# Patient Record
Sex: MALE | Race: WHITE | NOT HISPANIC OR LATINO | URBAN - METROPOLITAN AREA
[De-identification: names, ages, dates, MRNs, and addresses within clinical notes are randomized per-mention and may not be internally consistent; named-entity substitution may affect disease eponyms.]

---

## 2017-03-06 ENCOUNTER — FOLLOW UP ESTABLISHED (OUTPATIENT)
Dept: URBAN - METROPOLITAN AREA CLINIC 64 | Facility: CLINIC | Age: 72
End: 2017-03-06
Payer: COMMERCIAL

## 2017-03-06 DIAGNOSIS — H31.011 MACULA SCARS OF POSTERIOR POLE (POST TRAUMATIC), RIGHT EYE: Primary | ICD-10-CM

## 2017-03-06 DIAGNOSIS — H52.223 REGULAR ASTIGMATISM, BILATERAL: ICD-10-CM

## 2017-03-06 PROCEDURE — 92014 COMPRE OPH EXAM EST PT 1/>: CPT | Performed by: OPTOMETRIST

## 2017-03-06 PROCEDURE — 92015 DETERMINE REFRACTIVE STATE: CPT | Performed by: OPTOMETRIST

## 2017-03-06 ASSESSMENT — VISUAL ACUITY
OD: 20/20
OS: 20/20

## 2017-03-06 ASSESSMENT — KERATOMETRY
OD: 40.82
OS: 40.72

## 2017-03-06 ASSESSMENT — INTRAOCULAR PRESSURE
OS: 12
OD: 13

## 2017-05-31 ENCOUNTER — FOLLOW UP ESTABLISHED (OUTPATIENT)
Dept: URBAN - METROPOLITAN AREA CLINIC 64 | Facility: CLINIC | Age: 72
End: 2017-05-31
Payer: COMMERCIAL

## 2017-05-31 DIAGNOSIS — H33.312 HORSESHOE TEAR OF RETINA WITHOUT DETACHMENT, LEFT EYE: Primary | ICD-10-CM

## 2017-05-31 PROCEDURE — 92014 COMPRE OPH EXAM EST PT 1/>: CPT | Performed by: OPTOMETRIST

## 2017-05-31 PROCEDURE — 92134 CPTRZ OPH DX IMG PST SGM RTA: CPT | Performed by: OPTOMETRIST

## 2017-05-31 ASSESSMENT — INTRAOCULAR PRESSURE
OD: 14
OS: 15

## 2017-06-01 ENCOUNTER — FOLLOW UP ESTABLISHED (OUTPATIENT)
Dept: URBAN - METROPOLITAN AREA CLINIC 64 | Facility: CLINIC | Age: 72
End: 2017-06-01
Payer: COMMERCIAL

## 2017-06-01 PROCEDURE — 92014 COMPRE OPH EXAM EST PT 1/>: CPT | Performed by: OPHTHALMOLOGY

## 2017-06-01 PROCEDURE — 67105 REPAIR DETACHED RETINA PC: CPT | Performed by: OPHTHALMOLOGY

## 2017-06-01 PROCEDURE — 92134 CPTRZ OPH DX IMG PST SGM RTA: CPT | Performed by: OPHTHALMOLOGY

## 2017-06-01 ASSESSMENT — INTRAOCULAR PRESSURE
OS: 10
OD: 12

## 2017-06-05 ENCOUNTER — POST OP (OUTPATIENT)
Dept: URBAN - METROPOLITAN AREA CLINIC 64 | Facility: CLINIC | Age: 72
End: 2017-06-05

## 2017-06-05 PROCEDURE — 99024 POSTOP FOLLOW-UP VISIT: CPT | Performed by: OPTOMETRIST

## 2017-06-05 ASSESSMENT — INTRAOCULAR PRESSURE
OD: 7
OS: 6

## 2017-06-26 ENCOUNTER — FOLLOW UP ESTABLISHED (OUTPATIENT)
Dept: URBAN - METROPOLITAN AREA CLINIC 64 | Facility: CLINIC | Age: 72
End: 2017-06-26
Payer: COMMERCIAL

## 2017-06-26 DIAGNOSIS — H33.012 RETINAL DETACHMENT WITH SINGLE BREAK, LEFT EYE: Primary | ICD-10-CM

## 2017-06-26 PROCEDURE — 92134 CPTRZ OPH DX IMG PST SGM RTA: CPT | Performed by: OPTOMETRIST

## 2017-06-26 PROCEDURE — 92014 COMPRE OPH EXAM EST PT 1/>: CPT | Performed by: OPTOMETRIST

## 2017-06-26 ASSESSMENT — INTRAOCULAR PRESSURE
OS: 4
OD: 11

## 2017-06-27 ENCOUNTER — FOLLOW UP ESTABLISHED (OUTPATIENT)
Dept: URBAN - METROPOLITAN AREA CLINIC 10 | Facility: CLINIC | Age: 72
End: 2017-06-27
Payer: COMMERCIAL

## 2017-06-27 DIAGNOSIS — H35.371 PUCKERING OF MACULA, RIGHT EYE: ICD-10-CM

## 2017-06-27 PROCEDURE — 92134 CPTRZ OPH DX IMG PST SGM RTA: CPT | Performed by: OPHTHALMOLOGY

## 2017-06-27 PROCEDURE — 99215 OFFICE O/P EST HI 40 MIN: CPT | Performed by: OPHTHALMOLOGY

## 2017-06-27 RX ORDER — PREDNISOLONE ACETATE 10 MG/ML
1 % SUSPENSION/ DROPS OPHTHALMIC
Qty: 1 | Refills: 1 | Status: INACTIVE
Start: 2017-06-27 | End: 2017-08-03

## 2017-06-27 RX ORDER — OFLOXACIN 3 MG/ML
0.3 % SOLUTION/ DROPS OPHTHALMIC
Qty: 1 | Refills: 0 | Status: INACTIVE
Start: 2017-06-27 | End: 2017-07-12

## 2017-06-27 ASSESSMENT — INTRAOCULAR PRESSURE
OS: 7
OD: 12

## 2017-06-28 ENCOUNTER — SURGERY (OUTPATIENT)
Dept: URBAN - METROPOLITAN AREA SURGERY 5 | Facility: SURGERY | Age: 72
End: 2017-06-28
Payer: COMMERCIAL

## 2017-06-28 ENCOUNTER — Encounter (OUTPATIENT)
Dept: URBAN - METROPOLITAN AREA CLINIC 10 | Facility: CLINIC | Age: 72
End: 2017-06-28
Payer: COMMERCIAL

## 2017-06-28 DIAGNOSIS — Z01.818 ENCOUNTER FOR OTHER PREPROCEDURAL EXAMINATION: Primary | ICD-10-CM

## 2017-06-28 PROCEDURE — 67108 REPAIR DETACHED RETINA: CPT | Performed by: OPHTHALMOLOGY

## 2017-06-28 PROCEDURE — 99213 OFFICE O/P EST LOW 20 MIN: CPT | Performed by: PHYSICIAN ASSISTANT

## 2017-06-28 RX ORDER — IBUPROFEN 800 MG/1
800 MG TABLET, FILM COATED ORAL
Qty: 0 | Refills: 0 | Status: INACTIVE
Start: 2017-06-28 | End: 2018-01-04

## 2017-06-28 RX ORDER — LIDOCAINE 50 MG/G
5 % CREAM TOPICAL
Qty: 0 | Refills: 0 | Status: INACTIVE
Start: 2017-06-28 | End: 2018-01-04

## 2017-06-28 RX ORDER — HYDROCHLOROTHIAZIDE 25 MG/1
25 MG TABLET ORAL
Qty: 0 | Refills: 0 | Status: INACTIVE
Start: 2017-06-28 | End: 2018-01-04

## 2017-06-28 RX ORDER — FLUTICASONE PROPIONATE 50 UG/1
SPRAY, METERED NASAL
Qty: 0 | Refills: 0 | Status: INACTIVE
Start: 2017-06-28 | End: 2018-01-04

## 2017-06-28 RX ORDER — OMEPRAZOLE 20 MG/1
20 MG CAPSULE, DELAYED RELEASE ORAL
Qty: 0 | Refills: 0 | Status: INACTIVE
Start: 2017-06-28 | End: 2018-01-04

## 2017-06-29 ENCOUNTER — POST OP (OUTPATIENT)
Dept: URBAN - METROPOLITAN AREA CLINIC 10 | Facility: CLINIC | Age: 72
End: 2017-06-29

## 2017-06-29 PROCEDURE — 99024 POSTOP FOLLOW-UP VISIT: CPT | Performed by: OPTOMETRIST

## 2017-06-29 ASSESSMENT — INTRAOCULAR PRESSURE: OS: 9

## 2017-07-06 ENCOUNTER — FOLLOW UP ESTABLISHED (OUTPATIENT)
Dept: URBAN - METROPOLITAN AREA CLINIC 64 | Facility: CLINIC | Age: 72
End: 2017-07-06
Payer: COMMERCIAL

## 2017-07-06 PROCEDURE — 92134 CPTRZ OPH DX IMG PST SGM RTA: CPT | Performed by: OPHTHALMOLOGY

## 2017-07-06 PROCEDURE — 99024 POSTOP FOLLOW-UP VISIT: CPT | Performed by: OPHTHALMOLOGY

## 2017-07-06 ASSESSMENT — INTRAOCULAR PRESSURE
OS: 37
OD: 12

## 2017-07-12 ENCOUNTER — FOLLOW UP ESTABLISHED (OUTPATIENT)
Dept: URBAN - METROPOLITAN AREA CLINIC 64 | Facility: CLINIC | Age: 72
End: 2017-07-12
Payer: COMMERCIAL

## 2017-07-12 PROCEDURE — 92012 INTRM OPH EXAM EST PATIENT: CPT | Performed by: OPTOMETRIST

## 2017-07-12 RX ORDER — BRIMONIDINE TARTRATE, TIMOLOL MALEATE 2; 5 MG/ML; MG/ML
SOLUTION/ DROPS OPHTHALMIC
Qty: 0 | Refills: 0 | Status: INACTIVE
Start: 2017-07-12 | End: 2017-08-08

## 2017-07-12 ASSESSMENT — INTRAOCULAR PRESSURE
OS: 21
OS: 18
OD: 12
OD: 10

## 2017-08-03 ENCOUNTER — POST OP (OUTPATIENT)
Dept: URBAN - METROPOLITAN AREA CLINIC 64 | Facility: CLINIC | Age: 72
End: 2017-08-03
Payer: COMMERCIAL

## 2017-08-03 PROCEDURE — 92134 CPTRZ OPH DX IMG PST SGM RTA: CPT | Performed by: OPHTHALMOLOGY

## 2017-08-03 PROCEDURE — 99024 POSTOP FOLLOW-UP VISIT: CPT | Performed by: OPHTHALMOLOGY

## 2017-08-03 ASSESSMENT — INTRAOCULAR PRESSURE
OS: 15
OD: 14

## 2017-08-21 ENCOUNTER — FOLLOW UP ESTABLISHED (OUTPATIENT)
Dept: URBAN - METROPOLITAN AREA CLINIC 64 | Facility: CLINIC | Age: 72
End: 2017-08-21
Payer: COMMERCIAL

## 2017-08-21 PROCEDURE — 99213 OFFICE O/P EST LOW 20 MIN: CPT | Performed by: OPTOMETRIST

## 2017-08-21 ASSESSMENT — INTRAOCULAR PRESSURE
OS: 21
OS: 14
OD: 14

## 2017-09-07 ENCOUNTER — FOLLOW UP ESTABLISHED (OUTPATIENT)
Dept: URBAN - METROPOLITAN AREA CLINIC 64 | Facility: CLINIC | Age: 72
End: 2017-09-07
Payer: COMMERCIAL

## 2017-09-07 PROCEDURE — 92134 CPTRZ OPH DX IMG PST SGM RTA: CPT | Performed by: OPHTHALMOLOGY

## 2017-09-07 PROCEDURE — 99024 POSTOP FOLLOW-UP VISIT: CPT | Performed by: OPHTHALMOLOGY

## 2017-09-07 RX ORDER — PREDNISOLONE ACETATE 10 MG/ML
1 % SUSPENSION/ DROPS OPHTHALMIC
Qty: 1 | Refills: 1 | Status: INACTIVE
Start: 2017-09-07 | End: 2017-12-07

## 2017-09-07 RX ORDER — OFLOXACIN 3 MG/ML
0.3 % SOLUTION/ DROPS OPHTHALMIC
Qty: 1 | Refills: 0 | Status: INACTIVE
Start: 2017-09-07 | End: 2017-12-07

## 2017-09-07 ASSESSMENT — INTRAOCULAR PRESSURE
OS: 13
OD: 12

## 2017-10-17 ENCOUNTER — Encounter (OUTPATIENT)
Dept: URBAN - METROPOLITAN AREA CLINIC 64 | Facility: CLINIC | Age: 72
End: 2017-10-17
Payer: COMMERCIAL

## 2017-10-17 PROCEDURE — 99213 OFFICE O/P EST LOW 20 MIN: CPT | Performed by: NURSE PRACTITIONER

## 2017-10-17 RX ORDER — CALAMINE 8% AND ZINC OXIDE 8% 160 MG/ML
445 MG LOTION TOPICAL
Qty: 0 | Refills: 0 | Status: INACTIVE
Start: 2017-10-17 | End: 2018-01-04

## 2017-10-17 RX ORDER — ASPIRIN/CAFFEINE 500-32.5MG
TABLET ORAL
Qty: 0 | Refills: 0 | Status: INACTIVE
Start: 2017-10-17 | End: 2018-01-04

## 2017-11-02 ENCOUNTER — SURGERY (OUTPATIENT)
Dept: URBAN - METROPOLITAN AREA SURGERY 42 | Facility: SURGERY | Age: 72
End: 2017-11-02
Payer: COMMERCIAL

## 2017-11-02 PROCEDURE — 67113 REPAIR RETINAL DETACH CPLX: CPT | Performed by: OPHTHALMOLOGY

## 2017-11-03 ENCOUNTER — POST OP (OUTPATIENT)
Dept: URBAN - METROPOLITAN AREA CLINIC 64 | Facility: CLINIC | Age: 72
End: 2017-11-03

## 2017-11-03 PROCEDURE — 99024 POSTOP FOLLOW-UP VISIT: CPT | Performed by: OPTOMETRIST

## 2017-11-03 ASSESSMENT — INTRAOCULAR PRESSURE
OD: 11
OS: 10

## 2017-11-13 ENCOUNTER — FOLLOW UP ESTABLISHED (OUTPATIENT)
Dept: URBAN - METROPOLITAN AREA CLINIC 64 | Facility: CLINIC | Age: 72
End: 2017-11-13

## 2017-11-13 DIAGNOSIS — H40.052 OCULAR HYPERTENSION, LEFT EYE: Primary | ICD-10-CM

## 2017-11-13 PROCEDURE — 99024 POSTOP FOLLOW-UP VISIT: CPT | Performed by: OPTOMETRIST

## 2017-11-13 RX ORDER — BRIMONIDINE TARTRATE, TIMOLOL MALEATE 2; 5 MG/ML; MG/ML
SOLUTION/ DROPS OPHTHALMIC
Qty: 1 | Refills: 6 | Status: INACTIVE
Start: 2017-11-13 | End: 2018-03-27

## 2017-11-13 ASSESSMENT — INTRAOCULAR PRESSURE
OD: 13
OD: 16
OS: 28
OS: 34

## 2017-11-13 ASSESSMENT — KERATOMETRY
OS: 40.57
OD: 41.12

## 2017-11-21 ENCOUNTER — POST OP (OUTPATIENT)
Dept: URBAN - METROPOLITAN AREA CLINIC 64 | Facility: CLINIC | Age: 72
End: 2017-11-21

## 2017-11-21 PROCEDURE — 99024 POSTOP FOLLOW-UP VISIT: CPT | Performed by: OPTOMETRIST

## 2017-11-21 RX ORDER — DORZOLAMIDE HYDROCHLORIDE 20 MG/ML
2 % SOLUTION OPHTHALMIC
Qty: 1 | Refills: 3 | Status: INACTIVE
Start: 2017-11-21 | End: 2018-03-27

## 2017-11-21 ASSESSMENT — INTRAOCULAR PRESSURE
OS: 30
OD: 14

## 2017-11-27 ENCOUNTER — POST OP (OUTPATIENT)
Dept: URBAN - METROPOLITAN AREA CLINIC 64 | Facility: CLINIC | Age: 72
End: 2017-11-27

## 2017-11-27 PROCEDURE — 99024 POSTOP FOLLOW-UP VISIT: CPT | Performed by: OPTOMETRIST

## 2017-11-27 ASSESSMENT — INTRAOCULAR PRESSURE
OD: 15
OS: 21

## 2017-12-07 ENCOUNTER — POST OP (OUTPATIENT)
Dept: URBAN - METROPOLITAN AREA CLINIC 64 | Facility: CLINIC | Age: 72
End: 2017-12-07
Payer: COMMERCIAL

## 2017-12-07 PROCEDURE — 92134 CPTRZ OPH DX IMG PST SGM RTA: CPT | Performed by: OPHTHALMOLOGY

## 2017-12-07 PROCEDURE — 99024 POSTOP FOLLOW-UP VISIT: CPT | Performed by: OPHTHALMOLOGY

## 2017-12-07 RX ORDER — PREDNISOLONE ACETATE 10 MG/ML
1 % SUSPENSION/ DROPS OPHTHALMIC
Qty: 1 | Refills: 1 | Status: INACTIVE
Start: 2017-12-07 | End: 2018-01-04

## 2017-12-07 ASSESSMENT — INTRAOCULAR PRESSURE
OS: 14
OD: 12

## 2018-01-04 ENCOUNTER — FOLLOW UP ESTABLISHED (OUTPATIENT)
Dept: URBAN - METROPOLITAN AREA CLINIC 64 | Facility: CLINIC | Age: 73
End: 2018-01-04
Payer: COMMERCIAL

## 2018-01-04 PROCEDURE — 99024 POSTOP FOLLOW-UP VISIT: CPT | Performed by: OPHTHALMOLOGY

## 2018-01-04 PROCEDURE — 92134 CPTRZ OPH DX IMG PST SGM RTA: CPT | Performed by: OPHTHALMOLOGY

## 2018-01-04 RX ORDER — PREDNISOLONE ACETATE 10 MG/ML
1 % SUSPENSION/ DROPS OPHTHALMIC
Qty: 2 | Refills: 1 | Status: INACTIVE
Start: 2018-01-04 | End: 2018-05-24

## 2018-01-04 RX ORDER — OFLOXACIN 3 MG/ML
0.3 % SOLUTION/ DROPS OPHTHALMIC
Qty: 1 | Refills: 1 | Status: INACTIVE
Start: 2018-01-04 | End: 2018-03-27

## 2018-01-04 ASSESSMENT — INTRAOCULAR PRESSURE
OS: 18
OD: 14

## 2018-01-26 ENCOUNTER — Encounter (OUTPATIENT)
Dept: URBAN - METROPOLITAN AREA CLINIC 64 | Facility: CLINIC | Age: 73
End: 2018-01-26
Payer: COMMERCIAL

## 2018-01-26 PROCEDURE — 99213 OFFICE O/P EST LOW 20 MIN: CPT | Performed by: NURSE PRACTITIONER

## 2018-01-26 RX ORDER — BRINZOLAMIDE 10 MG/ML
1 % SUSPENSION/ DROPS OPHTHALMIC
Qty: 1 | Refills: 3 | Status: INACTIVE
Start: 2018-01-26 | End: 2018-03-27

## 2018-02-01 ENCOUNTER — SURGERY (OUTPATIENT)
Dept: URBAN - METROPOLITAN AREA SURGERY 42 | Facility: SURGERY | Age: 73
End: 2018-02-01
Payer: COMMERCIAL

## 2018-02-01 PROCEDURE — 67036 REMOVAL OF INNER EYE FLUID: CPT | Performed by: OPHTHALMOLOGY

## 2018-02-02 ENCOUNTER — POST OP (OUTPATIENT)
Dept: URBAN - METROPOLITAN AREA CLINIC 64 | Facility: CLINIC | Age: 73
End: 2018-02-02

## 2018-02-02 PROCEDURE — 99024 POSTOP FOLLOW-UP VISIT: CPT | Performed by: OPTOMETRIST

## 2018-02-02 ASSESSMENT — INTRAOCULAR PRESSURE: OS: 9

## 2018-03-01 ENCOUNTER — FOLLOW UP ESTABLISHED (OUTPATIENT)
Dept: URBAN - METROPOLITAN AREA CLINIC 64 | Facility: CLINIC | Age: 73
End: 2018-03-01

## 2018-03-01 PROCEDURE — 99024 POSTOP FOLLOW-UP VISIT: CPT | Performed by: OPHTHALMOLOGY

## 2018-03-01 ASSESSMENT — INTRAOCULAR PRESSURE
OD: 14
OS: 28

## 2018-03-27 ENCOUNTER — POST OP (OUTPATIENT)
Dept: URBAN - METROPOLITAN AREA CLINIC 64 | Facility: CLINIC | Age: 73
End: 2018-03-27

## 2018-03-27 PROCEDURE — 99024 POSTOP FOLLOW-UP VISIT: CPT | Performed by: OPTOMETRIST

## 2018-03-27 RX ORDER — TIMOLOL MALEATE 5 MG/ML
0.5 % SOLUTION/ DROPS OPHTHALMIC
Qty: 1 | Refills: 1 | Status: INACTIVE
Start: 2018-03-27 | End: 2018-03-27

## 2018-03-27 ASSESSMENT — INTRAOCULAR PRESSURE
OD: 14
OS: 33

## 2018-03-27 ASSESSMENT — VISUAL ACUITY
OS: 20/50
OD: 20/40

## 2018-04-02 ENCOUNTER — POST OP (OUTPATIENT)
Dept: URBAN - METROPOLITAN AREA CLINIC 64 | Facility: CLINIC | Age: 73
End: 2018-04-02

## 2018-04-02 DIAGNOSIS — Z96.1 PRESENCE OF INTRAOCULAR LENS: Primary | ICD-10-CM

## 2018-04-02 PROCEDURE — 99024 POSTOP FOLLOW-UP VISIT: CPT | Performed by: OPTOMETRIST

## 2018-04-02 RX ORDER — TIMOLOL MALEATE 5 MG/ML
0.5 % SOLUTION/ DROPS OPHTHALMIC
Qty: 1 | Refills: 0 | Status: INACTIVE
Start: 2018-04-02 | End: 2018-07-23

## 2018-04-02 ASSESSMENT — INTRAOCULAR PRESSURE
OS: 20
OD: 12

## 2018-04-23 ENCOUNTER — POST OP (OUTPATIENT)
Dept: URBAN - METROPOLITAN AREA CLINIC 64 | Facility: CLINIC | Age: 73
End: 2018-04-23
Payer: COMMERCIAL

## 2018-04-23 PROCEDURE — 99024 POSTOP FOLLOW-UP VISIT: CPT | Performed by: OPTOMETRIST

## 2018-04-23 ASSESSMENT — INTRAOCULAR PRESSURE
OD: 15
OS: 22
OD: 10
OS: 17

## 2018-05-24 ENCOUNTER — FOLLOW UP ESTABLISHED (OUTPATIENT)
Dept: URBAN - METROPOLITAN AREA CLINIC 64 | Facility: CLINIC | Age: 73
End: 2018-05-24
Payer: COMMERCIAL

## 2018-05-24 PROCEDURE — 92014 COMPRE OPH EXAM EST PT 1/>: CPT | Performed by: OPHTHALMOLOGY

## 2018-05-24 PROCEDURE — 92134 CPTRZ OPH DX IMG PST SGM RTA: CPT | Performed by: OPHTHALMOLOGY

## 2018-05-24 ASSESSMENT — INTRAOCULAR PRESSURE
OS: 17
OD: 11

## 2018-07-23 ENCOUNTER — FOLLOW UP ESTABLISHED (OUTPATIENT)
Dept: URBAN - METROPOLITAN AREA CLINIC 64 | Facility: CLINIC | Age: 73
End: 2018-07-23
Payer: COMMERCIAL

## 2018-07-23 DIAGNOSIS — H52.13 MYOPIA, BILATERAL: ICD-10-CM

## 2018-07-23 PROCEDURE — 92014 COMPRE OPH EXAM EST PT 1/>: CPT | Performed by: OPTOMETRIST

## 2018-07-23 PROCEDURE — 92015 DETERMINE REFRACTIVE STATE: CPT | Performed by: OPTOMETRIST

## 2018-07-23 ASSESSMENT — KERATOMETRY
OS: 41.19
OD: 41.22

## 2018-07-23 ASSESSMENT — INTRAOCULAR PRESSURE
OD: 9
OS: 12

## 2018-07-23 ASSESSMENT — VISUAL ACUITY
OS: 20/50
OD: 20/60

## 2018-10-11 ENCOUNTER — FOLLOW UP ESTABLISHED (OUTPATIENT)
Dept: URBAN - METROPOLITAN AREA CLINIC 64 | Facility: CLINIC | Age: 73
End: 2018-10-11
Payer: COMMERCIAL

## 2018-10-11 PROCEDURE — 92014 COMPRE OPH EXAM EST PT 1/>: CPT | Performed by: OPTOMETRIST

## 2018-10-11 RX ORDER — BRIMONIDINE TARTRATE, TIMOLOL MALEATE 2; 5 MG/ML; MG/ML
SOLUTION/ DROPS OPHTHALMIC
Qty: 1 | Refills: 1 | Status: INACTIVE
Start: 2018-10-11 | End: 2018-12-27

## 2018-10-11 ASSESSMENT — INTRAOCULAR PRESSURE
OS: 38
OD: 17
OS: 36
OD: 12

## 2018-10-29 ENCOUNTER — FOLLOW UP ESTABLISHED (OUTPATIENT)
Dept: URBAN - METROPOLITAN AREA CLINIC 64 | Facility: CLINIC | Age: 73
End: 2018-10-29
Payer: COMMERCIAL

## 2018-10-29 PROCEDURE — 92012 INTRM OPH EXAM EST PATIENT: CPT | Performed by: OPTOMETRIST

## 2018-10-29 ASSESSMENT — INTRAOCULAR PRESSURE
OS: 22
OD: 12

## 2019-02-01 ENCOUNTER — FOLLOW UP ESTABLISHED (OUTPATIENT)
Dept: URBAN - METROPOLITAN AREA CLINIC 64 | Facility: CLINIC | Age: 74
End: 2019-02-01
Payer: MEDICARE

## 2019-02-01 PROCEDURE — 92012 INTRM OPH EXAM EST PATIENT: CPT | Performed by: OPTOMETRIST

## 2019-02-01 ASSESSMENT — INTRAOCULAR PRESSURE
OD: 13
OS: 19

## 2019-06-27 ENCOUNTER — FOLLOW UP ESTABLISHED (OUTPATIENT)
Dept: URBAN - METROPOLITAN AREA CLINIC 64 | Facility: CLINIC | Age: 74
End: 2019-06-27
Payer: MEDICARE

## 2019-06-27 PROCEDURE — 92083 EXTENDED VISUAL FIELD XM: CPT | Performed by: OPTOMETRIST

## 2019-06-27 PROCEDURE — 92133 CPTRZD OPH DX IMG PST SGM ON: CPT | Performed by: OPTOMETRIST

## 2019-06-27 PROCEDURE — 92012 INTRM OPH EXAM EST PATIENT: CPT | Performed by: OPTOMETRIST

## 2019-06-27 ASSESSMENT — INTRAOCULAR PRESSURE
OD: 14
OS: 14

## 2019-09-23 ENCOUNTER — FOLLOW UP ESTABLISHED (OUTPATIENT)
Dept: URBAN - METROPOLITAN AREA CLINIC 64 | Facility: CLINIC | Age: 74
End: 2019-09-23
Payer: MEDICARE

## 2019-09-23 DIAGNOSIS — H02.423 MYOGENIC PTOSIS OF BILATERAL EYELIDS: ICD-10-CM

## 2019-09-23 PROCEDURE — 92012 INTRM OPH EXAM EST PATIENT: CPT | Performed by: OPTOMETRIST

## 2019-09-23 ASSESSMENT — INTRAOCULAR PRESSURE
OD: 13
OS: 24

## 2020-02-12 ENCOUNTER — FOLLOW UP ESTABLISHED (OUTPATIENT)
Dept: URBAN - METROPOLITAN AREA CLINIC 64 | Facility: CLINIC | Age: 75
End: 2020-02-12
Payer: MEDICARE

## 2020-02-12 PROCEDURE — 92012 INTRM OPH EXAM EST PATIENT: CPT | Performed by: OPTOMETRIST

## 2020-02-14 ENCOUNTER — FOLLOW UP ESTABLISHED (OUTPATIENT)
Dept: URBAN - METROPOLITAN AREA CLINIC 64 | Facility: CLINIC | Age: 75
End: 2020-02-14
Payer: MEDICARE

## 2020-02-14 DIAGNOSIS — H02.532 EYELID RETRACTION RIGHT LOWER LID: ICD-10-CM

## 2020-02-14 PROCEDURE — 92285 EXTERNAL OCULAR PHOTOGRAPHY: CPT | Performed by: OPHTHALMOLOGY

## 2020-02-14 PROCEDURE — 99214 OFFICE O/P EST MOD 30 MIN: CPT | Performed by: OPHTHALMOLOGY

## 2020-02-14 PROCEDURE — 92081 LIMITED VISUAL FIELD XM: CPT | Performed by: OPHTHALMOLOGY

## 2020-02-14 RX ORDER — ERYTHROMYCIN 5 MG/G
OINTMENT OPHTHALMIC
Qty: 1 | Refills: 1 | Status: INACTIVE
Start: 2020-02-14 | End: 2020-05-13

## 2020-03-13 ENCOUNTER — FOLLOW UP ESTABLISHED (OUTPATIENT)
Dept: URBAN - METROPOLITAN AREA CLINIC 64 | Facility: CLINIC | Age: 75
End: 2020-03-13
Payer: MEDICARE

## 2020-03-13 PROCEDURE — 92012 INTRM OPH EXAM EST PATIENT: CPT | Performed by: OPHTHALMOLOGY

## 2020-03-13 ASSESSMENT — INTRAOCULAR PRESSURE
OS: 27
OD: 16

## 2020-05-14 ENCOUNTER — Encounter (OUTPATIENT)
Dept: URBAN - METROPOLITAN AREA CLINIC 64 | Facility: CLINIC | Age: 75
End: 2020-05-14
Payer: MEDICARE

## 2020-05-14 DIAGNOSIS — H02.535 EYELID RETRACTION LEFT LOWER EYELID: ICD-10-CM

## 2020-05-14 PROCEDURE — 99213 OFFICE O/P EST LOW 20 MIN: CPT | Performed by: NURSE PRACTITIONER

## 2020-05-21 ENCOUNTER — SURGERY (OUTPATIENT)
Dept: URBAN - METROPOLITAN AREA SURGERY 50 | Facility: SURGERY | Age: 75
End: 2020-05-21
Payer: MEDICARE

## 2020-05-21 PROCEDURE — 65855 TRABECULOPLASTY LASER SURG: CPT | Performed by: OPHTHALMOLOGY

## 2020-05-21 RX ORDER — PREDNISOLONE ACETATE 10 MG/ML
1 % SUSPENSION/ DROPS OPHTHALMIC
Qty: 1 | Refills: 0 | Status: ACTIVE
Start: 2020-05-21

## 2020-05-28 ENCOUNTER — POST OP (OUTPATIENT)
Dept: URBAN - METROPOLITAN AREA CLINIC 64 | Facility: CLINIC | Age: 75
End: 2020-05-28

## 2020-05-28 DIAGNOSIS — H40.012 OPEN ANGLE WITH BORDERLINE FINDINGS, LOW RISK, LEFT EYE: Primary | ICD-10-CM

## 2020-05-28 PROCEDURE — 99024 POSTOP FOLLOW-UP VISIT: CPT | Performed by: OPTOMETRIST

## 2020-05-28 ASSESSMENT — INTRAOCULAR PRESSURE
OS: 25
OD: 15
OS: 23

## 2020-06-05 ENCOUNTER — SURGERY (OUTPATIENT)
Dept: URBAN - METROPOLITAN AREA SURGERY 42 | Facility: SURGERY | Age: 75
End: 2020-06-05
Payer: MEDICARE

## 2020-06-19 ENCOUNTER — POST OP (OUTPATIENT)
Dept: URBAN - METROPOLITAN AREA CLINIC 64 | Facility: CLINIC | Age: 75
End: 2020-06-19

## 2020-06-19 DIAGNOSIS — Z41.1 ENCOUNTER FOR COSMETIC SURGERY: Primary | ICD-10-CM

## 2022-05-11 ENCOUNTER — OFFICE VISIT (OUTPATIENT)
Dept: URBAN - METROPOLITAN AREA CLINIC 64 | Facility: CLINIC | Age: 77
End: 2022-05-11
Payer: COMMERCIAL

## 2022-05-11 DIAGNOSIS — H40.012 OPEN ANGLE WITH BORDERLINE FINDINGS, LOW RISK, LEFT EYE: Primary | ICD-10-CM

## 2022-05-11 PROCEDURE — 92133 CPTRZD OPH DX IMG PST SGM ON: CPT | Performed by: OPTOMETRIST

## 2022-05-11 PROCEDURE — 99214 OFFICE O/P EST MOD 30 MIN: CPT | Performed by: OPTOMETRIST

## 2022-05-11 PROCEDURE — 92083 EXTENDED VISUAL FIELD XM: CPT | Performed by: OPTOMETRIST

## 2022-05-11 RX ORDER — BRIMONIDINE TARTRATE, TIMOLOL MALEATE 2; 5 MG/ML; MG/ML
SOLUTION/ DROPS OPHTHALMIC
Qty: 3 | Refills: 3 | Status: ACTIVE
Start: 2022-05-11

## 2022-05-11 ASSESSMENT — INTRAOCULAR PRESSURE
OD: 11
OS: 14

## 2022-05-11 NOTE — IMPRESSION/PLAN
Impression: Open angle with borderline findings, low risk, left eye Plan: IOP 11/14 today, continue Combigan OU. HVF/RNFL performed today, stable from last test.
RTC 4 months for IOP check.

## 2022-09-12 ENCOUNTER — OFFICE VISIT (OUTPATIENT)
Dept: URBAN - METROPOLITAN AREA CLINIC 64 | Facility: CLINIC | Age: 77
End: 2022-09-12
Payer: COMMERCIAL

## 2022-09-12 DIAGNOSIS — H40.012 OPEN ANGLE WITH BORDERLINE FINDINGS, LOW RISK, LEFT EYE: Primary | ICD-10-CM

## 2022-09-12 PROCEDURE — 99214 OFFICE O/P EST MOD 30 MIN: CPT | Performed by: OPTOMETRIST

## 2022-09-12 RX ORDER — BRIMONIDINE TARTRATE, TIMOLOL MALEATE 2; 5 MG/ML; MG/ML
SOLUTION/ DROPS OPHTHALMIC
Qty: 3 | Refills: 3 | Status: ACTIVE
Start: 2022-09-12

## 2022-09-12 ASSESSMENT — INTRAOCULAR PRESSURE
OD: 15
OS: 17

## 2022-09-12 ASSESSMENT — KERATOMETRY
OS: 40.87
OD: 41.17

## 2022-09-12 NOTE — IMPRESSION/PLAN
Impression: Open angle with borderline findings, low risk, left eye Plan: IOP 15/17 today, continue Combigan OU. HVF/RNFL up to date RTC 4 months for IOP check and optos testing.

## 2025-04-24 ENCOUNTER — OFFICE VISIT (OUTPATIENT)
Dept: URBAN - METROPOLITAN AREA CLINIC 64 | Facility: LOCATION | Age: 80
End: 2025-04-24
Payer: MEDICARE

## 2025-04-24 DIAGNOSIS — H35.3113 NEXDTVE AGE-REL MCLR DEGN, R EYE, ADV ATRPC W/O SBFVL INVL: ICD-10-CM

## 2025-04-24 DIAGNOSIS — H40.012 OPEN ANGLE WITH BORDERLINE FINDINGS, LOW RISK, LEFT EYE: Primary | ICD-10-CM

## 2025-04-24 PROCEDURE — 92134 CPTRZ OPH DX IMG PST SGM RTA: CPT | Performed by: OPTOMETRIST

## 2025-04-24 PROCEDURE — 99214 OFFICE O/P EST MOD 30 MIN: CPT | Performed by: OPTOMETRIST

## 2025-04-24 RX ORDER — TIMOLOL MALEATE 6.8 MG/ML
0.5 % SOLUTION/ DROPS OPHTHALMIC
Qty: 3 | Refills: 3 | Status: ACTIVE
Start: 2025-04-24

## 2025-04-24 RX ORDER — LATANOPROST 50 UG/ML
0.005 % SOLUTION OPHTHALMIC
Qty: 3 | Refills: 3 | Status: ACTIVE
Start: 2025-04-24

## 2025-04-24 ASSESSMENT — INTRAOCULAR PRESSURE
OS: 28
OD: 16

## 2025-05-27 ENCOUNTER — OFFICE VISIT (OUTPATIENT)
Dept: URBAN - METROPOLITAN AREA CLINIC 64 | Facility: LOCATION | Age: 80
End: 2025-05-27
Payer: MEDICARE

## 2025-05-27 DIAGNOSIS — H33.052 TOTAL RETINAL DETACHMENT, LEFT EYE: ICD-10-CM

## 2025-05-27 DIAGNOSIS — H35.54 VITELLIFORM RETINAL DYSTROPHY: ICD-10-CM

## 2025-05-27 DIAGNOSIS — H33.051 TOTAL RETINAL DETACHMENT, RIGHT EYE: Primary | ICD-10-CM

## 2025-05-27 PROCEDURE — 99204 OFFICE O/P NEW MOD 45 MIN: CPT | Performed by: OPHTHALMOLOGY

## 2025-05-27 PROCEDURE — 92134 CPTRZ OPH DX IMG PST SGM RTA: CPT | Performed by: OPHTHALMOLOGY

## 2025-05-27 ASSESSMENT — INTRAOCULAR PRESSURE
OD: 16
OS: 19

## 2025-07-02 ENCOUNTER — OFFICE VISIT (OUTPATIENT)
Dept: URBAN - METROPOLITAN AREA CLINIC 64 | Facility: LOCATION | Age: 80
End: 2025-07-02
Payer: MEDICARE

## 2025-07-02 DIAGNOSIS — H52.4 PRESBYOPIA: ICD-10-CM

## 2025-07-02 DIAGNOSIS — H40.1132 PRIMARY OPEN-ANGLE GLAUCOMA, MODERATE STAGE, BILATERAL: Primary | ICD-10-CM

## 2025-07-02 DIAGNOSIS — H43.391 OTHER VITREOUS OPACITIES, RIGHT EYE: ICD-10-CM

## 2025-07-02 DIAGNOSIS — H16.223 KERATOCONJUNCTIVITIS SICCA, BILATERAL: ICD-10-CM

## 2025-07-02 PROCEDURE — 92133 CPTRZD OPH DX IMG PST SGM ON: CPT | Performed by: OPTOMETRIST

## 2025-07-02 PROCEDURE — 99214 OFFICE O/P EST MOD 30 MIN: CPT | Performed by: OPTOMETRIST

## 2025-07-02 PROCEDURE — 92083 EXTENDED VISUAL FIELD XM: CPT | Performed by: OPTOMETRIST

## 2025-07-02 RX ORDER — TIMOLOL HEMIHYDRATE 5 MG/ML
0.5 % SOLUTION/ DROPS OPHTHALMIC
Qty: 5 | Refills: 3 | Status: ACTIVE
Start: 2025-07-02

## 2025-07-02 RX ORDER — LATANOPROST 50 UG/ML
0.005 % SOLUTION OPHTHALMIC
Qty: 5 | Refills: 3 | Status: ACTIVE
Start: 2025-07-02

## 2025-07-02 ASSESSMENT — INTRAOCULAR PRESSURE
OS: 23
OD: 15

## 2025-07-02 ASSESSMENT — VISUAL ACUITY
OD: 20/70
OS: 20/40